# Patient Record
Sex: FEMALE | URBAN - METROPOLITAN AREA
[De-identification: names, ages, dates, MRNs, and addresses within clinical notes are randomized per-mention and may not be internally consistent; named-entity substitution may affect disease eponyms.]

---

## 2020-10-21 ENCOUNTER — TELEPHONE (OUTPATIENT)
Dept: ENDOCRINOLOGY | Facility: CLINIC | Age: 45
End: 2020-10-21

## 2020-10-21 NOTE — TELEPHONE ENCOUNTER
Pt is relocating and is requesting PCP to transfer over any medical files. She said at least 5 years. She explained once the  medical records request is mailed to her she can let us know the fax number to the desired location once she finds [email or web address]  PT confirmed her home address is correct.

## 2020-10-29 ENCOUNTER — TELEPHONE (OUTPATIENT)
Dept: ENDOCRINOLOGY | Facility: CLINIC | Age: 45
End: 2020-10-29

## 2020-10-29 NOTE — TELEPHONE ENCOUNTER
Pt is requesting her medical records request of her TSH levels. The phone disconnected before I could ask her where she wanted them faxed to. I left a voicemail for her to contact and let us know if she wanted them faxed or mailed to her.

## 2020-10-29 NOTE — TELEPHONE ENCOUNTER
Pt said she is just needing the TSH level from last year sent to  Dr Kurtis Garcia      Fax: [phone]